# Patient Record
Sex: FEMALE | Race: WHITE | NOT HISPANIC OR LATINO | Employment: UNEMPLOYED | URBAN - METROPOLITAN AREA
[De-identification: names, ages, dates, MRNs, and addresses within clinical notes are randomized per-mention and may not be internally consistent; named-entity substitution may affect disease eponyms.]

---
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
## 2023-09-08 ENCOUNTER — APPOINTMENT (OUTPATIENT)
Dept: GENERAL RADIOLOGY | Facility: CLINIC | Age: 18
End: 2023-09-08
Attending: NURSE PRACTITIONER
Payer: COMMERCIAL

## 2023-09-08 ENCOUNTER — HOSPITAL ENCOUNTER (EMERGENCY)
Facility: CLINIC | Age: 18
Discharge: HOME | End: 2023-09-08
Attending: INTERNAL MEDICINE
Payer: COMMERCIAL

## 2023-09-08 ENCOUNTER — HOSPITAL ENCOUNTER (EMERGENCY)
Facility: CLINIC | Age: 18
Discharge: HOME | End: 2023-09-08
Attending: INTERNAL MEDICINE | Admitting: INTERNAL MEDICINE
Payer: COMMERCIAL

## 2023-09-08 VITALS
OXYGEN SATURATION: 98 % | SYSTOLIC BLOOD PRESSURE: 104 MMHG | HEART RATE: 74 BPM | RESPIRATION RATE: 16 BRPM | TEMPERATURE: 98.6 F | DIASTOLIC BLOOD PRESSURE: 72 MMHG

## 2023-09-08 VITALS
HEART RATE: 90 BPM | TEMPERATURE: 98.2 F | OXYGEN SATURATION: 98 % | DIASTOLIC BLOOD PRESSURE: 75 MMHG | RESPIRATION RATE: 18 BRPM | SYSTOLIC BLOOD PRESSURE: 110 MMHG

## 2023-09-08 DIAGNOSIS — L97.509 FOOT ULCER (H): ICD-10-CM

## 2023-09-08 DIAGNOSIS — N39.0 UTI (URINARY TRACT INFECTION): ICD-10-CM

## 2023-09-08 DIAGNOSIS — A59.9 TRICHOMONAS INFECTION: ICD-10-CM

## 2023-09-08 DIAGNOSIS — A54.9 GONORRHEA: ICD-10-CM

## 2023-09-08 DIAGNOSIS — A74.9 CHLAMYDIA: ICD-10-CM

## 2023-09-08 DIAGNOSIS — K13.79 MOUTH SORES: ICD-10-CM

## 2023-09-08 DIAGNOSIS — A59.9 TRICHIMONIASIS: ICD-10-CM

## 2023-09-08 DIAGNOSIS — R39.89 GENITAL SORE: ICD-10-CM

## 2023-09-08 DIAGNOSIS — Z53.20 TREATMENT DECLINED BY PATIENT: ICD-10-CM

## 2023-09-08 DIAGNOSIS — R05.1 ACUTE COUGH: ICD-10-CM

## 2023-09-08 DIAGNOSIS — R23.8 VESICULAR RASH: ICD-10-CM

## 2023-09-08 LAB
ALBUMIN SERPL BCG-MCNC: 3.5 G/DL (ref 3.5–5.2)
ALBUMIN UR-MCNC: 10 MG/DL
ALP SERPL-CCNC: 645 U/L (ref 35–104)
ALT SERPL W P-5'-P-CCNC: 49 U/L (ref 0–50)
ANION GAP SERPL CALCULATED.3IONS-SCNC: 11 MMOL/L (ref 7–15)
APPEARANCE UR: ABNORMAL
AST SERPL W P-5'-P-CCNC: 59 U/L (ref 0–45)
BACTERIA #/AREA URNS HPF: ABNORMAL /HPF
BASOPHILS # BLD AUTO: 0.1 10E3/UL (ref 0–0.2)
BASOPHILS NFR BLD AUTO: 1 %
BILIRUB SERPL-MCNC: 0.6 MG/DL
BILIRUB UR QL STRIP: NEGATIVE
BUN SERPL-MCNC: 7.8 MG/DL (ref 6–20)
C TRACH DNA SPEC QL NAA+PROBE: POSITIVE
CALCIUM SERPL-MCNC: 9.2 MG/DL (ref 8.6–10)
CAOX CRY #/AREA URNS HPF: ABNORMAL /HPF
CHLORIDE SERPL-SCNC: 100 MMOL/L (ref 98–107)
COLOR UR AUTO: YELLOW
CREAT SERPL-MCNC: 0.55 MG/DL (ref 0.51–0.95)
CRP SERPL-MCNC: 7.42 MG/L
DEPRECATED HCO3 PLAS-SCNC: 27 MMOL/L (ref 22–29)
EGFRCR SERPLBLD CKD-EPI 2021: >90 ML/MIN/1.73M2
EOSINOPHIL # BLD AUTO: 0.7 10E3/UL (ref 0–0.7)
EOSINOPHIL NFR BLD AUTO: 8 %
ERYTHROCYTE [DISTWIDTH] IN BLOOD BY AUTOMATED COUNT: 14.9 % (ref 10–15)
GLUCOSE SERPL-MCNC: 83 MG/DL (ref 70–99)
GLUCOSE UR STRIP-MCNC: NEGATIVE MG/DL
HBV CORE AB SERPL QL IA: NONREACTIVE
HBV SURFACE AB SERPL IA-ACNC: 0.79 M[IU]/ML
HBV SURFACE AB SERPL IA-ACNC: NONREACTIVE M[IU]/ML
HBV SURFACE AG SERPL QL IA: NONREACTIVE
HCG SERPL QL: NEGATIVE
HCG UR QL: NEGATIVE
HCT VFR BLD AUTO: 37.5 % (ref 35–47)
HCV AB SERPL QL IA: NONREACTIVE
HGB BLD-MCNC: 12.3 G/DL (ref 11.7–15.7)
HGB UR QL STRIP: NEGATIVE
HIV 1+2 AB+HIV1 P24 AG SERPL QL IA: NONREACTIVE
IMM GRANULOCYTES # BLD: 0.1 10E3/UL
IMM GRANULOCYTES NFR BLD: 1 %
INR PPP: 1.04 (ref 0.85–1.15)
KETONES UR STRIP-MCNC: NEGATIVE MG/DL
LACTATE SERPL-SCNC: 0.7 MMOL/L (ref 0.7–2)
LEUKOCYTE ESTERASE UR QL STRIP: ABNORMAL
LYMPHOCYTES # BLD AUTO: 2.7 10E3/UL (ref 0.8–5.3)
LYMPHOCYTES NFR BLD AUTO: 31 %
MCH RBC QN AUTO: 28.5 PG (ref 26.5–33)
MCHC RBC AUTO-ENTMCNC: 32.8 G/DL (ref 31.5–36.5)
MCV RBC AUTO: 87 FL (ref 78–100)
MONOCYTES # BLD AUTO: 0.5 10E3/UL (ref 0–1.3)
MONOCYTES NFR BLD AUTO: 6 %
MUCOUS THREADS #/AREA URNS LPF: PRESENT /LPF
N GONORRHOEA DNA SPEC QL NAA+PROBE: POSITIVE
NEUTROPHILS # BLD AUTO: 4.7 10E3/UL (ref 1.6–8.3)
NEUTROPHILS NFR BLD AUTO: 53 %
NITRATE UR QL: POSITIVE
NRBC # BLD AUTO: 0 10E3/UL
NRBC BLD AUTO-RTO: 0 /100
PH UR STRIP: 5.5 [PH] (ref 5–7)
PLATELET # BLD AUTO: 418 10E3/UL (ref 150–450)
POTASSIUM SERPL-SCNC: 4.3 MMOL/L (ref 3.4–5.3)
PROCALCITONIN SERPL IA-MCNC: 0.17 NG/ML
PROT SERPL-MCNC: 7.8 G/DL (ref 6.4–8.3)
RBC # BLD AUTO: 4.31 10E6/UL (ref 3.8–5.2)
RBC URINE: 9 /HPF
SARS-COV-2 RNA RESP QL NAA+PROBE: NEGATIVE
SODIUM SERPL-SCNC: 138 MMOL/L (ref 136–145)
SP GR UR STRIP: 1.02 (ref 1–1.03)
SQUAMOUS EPITHELIAL: 2 /HPF
T PALLIDUM AB SER QL: REACTIVE
T VAGINALIS DNA SPEC QL NAA+PROBE: DETECTED
TROPONIN T SERPL HS-MCNC: <6 NG/L
UROBILINOGEN UR STRIP-MCNC: NORMAL MG/DL
WBC # BLD AUTO: 8.8 10E3/UL (ref 4–11)
WBC URINE: 14 /HPF

## 2023-09-08 PROCEDURE — 250N000011 HC RX IP 250 OP 636: Mod: JZ | Performed by: NURSE PRACTITIONER

## 2023-09-08 PROCEDURE — 36415 COLL VENOUS BLD VENIPUNCTURE: CPT | Performed by: INTERNAL MEDICINE

## 2023-09-08 PROCEDURE — 86780 TREPONEMA PALLIDUM: CPT | Performed by: NURSE PRACTITIONER

## 2023-09-08 PROCEDURE — 87635 SARS-COV-2 COVID-19 AMP PRB: CPT | Performed by: NURSE PRACTITIONER

## 2023-09-08 PROCEDURE — 87086 URINE CULTURE/COLONY COUNT: CPT | Performed by: INTERNAL MEDICINE

## 2023-09-08 PROCEDURE — 99285 EMERGENCY DEPT VISIT HI MDM: CPT | Mod: 25 | Performed by: INTERNAL MEDICINE

## 2023-09-08 PROCEDURE — 86704 HEP B CORE ANTIBODY TOTAL: CPT | Performed by: NURSE PRACTITIONER

## 2023-09-08 PROCEDURE — 71046 X-RAY EXAM CHEST 2 VIEWS: CPT | Mod: 26 | Performed by: RADIOLOGY

## 2023-09-08 PROCEDURE — 96372 THER/PROPH/DIAG INJ SC/IM: CPT | Performed by: NURSE PRACTITIONER

## 2023-09-08 PROCEDURE — 87340 HEPATITIS B SURFACE AG IA: CPT | Performed by: NURSE PRACTITIONER

## 2023-09-08 PROCEDURE — 250N000009 HC RX 250: Performed by: NURSE PRACTITIONER

## 2023-09-08 PROCEDURE — 85610 PROTHROMBIN TIME: CPT | Performed by: INTERNAL MEDICINE

## 2023-09-08 PROCEDURE — 93005 ELECTROCARDIOGRAM TRACING: CPT | Performed by: INTERNAL MEDICINE

## 2023-09-08 PROCEDURE — 84145 PROCALCITONIN (PCT): CPT | Performed by: INTERNAL MEDICINE

## 2023-09-08 PROCEDURE — 99284 EMERGENCY DEPT VISIT MOD MDM: CPT | Mod: 27 | Performed by: INTERNAL MEDICINE

## 2023-09-08 PROCEDURE — 80053 COMPREHEN METABOLIC PANEL: CPT | Performed by: INTERNAL MEDICINE

## 2023-09-08 PROCEDURE — 84484 ASSAY OF TROPONIN QUANT: CPT | Performed by: NURSE PRACTITIONER

## 2023-09-08 PROCEDURE — 86593 SYPHILIS TEST NON-TREP QUANT: CPT | Performed by: NURSE PRACTITIONER

## 2023-09-08 PROCEDURE — 86706 HEP B SURFACE ANTIBODY: CPT | Performed by: NURSE PRACTITIONER

## 2023-09-08 PROCEDURE — 99284 EMERGENCY DEPT VISIT MOD MDM: CPT | Mod: 25 | Performed by: INTERNAL MEDICINE

## 2023-09-08 PROCEDURE — 87389 HIV-1 AG W/HIV-1&-2 AB AG IA: CPT | Performed by: INTERNAL MEDICINE

## 2023-09-08 PROCEDURE — 36415 COLL VENOUS BLD VENIPUNCTURE: CPT | Performed by: NURSE PRACTITIONER

## 2023-09-08 PROCEDURE — 86140 C-REACTIVE PROTEIN: CPT | Performed by: INTERNAL MEDICINE

## 2023-09-08 PROCEDURE — 86803 HEPATITIS C AB TEST: CPT | Performed by: NURSE PRACTITIONER

## 2023-09-08 PROCEDURE — 87147 CULTURE TYPE IMMUNOLOGIC: CPT | Performed by: NURSE PRACTITIONER

## 2023-09-08 PROCEDURE — 86592 SYPHILIS TEST NON-TREP QUAL: CPT | Performed by: NURSE PRACTITIONER

## 2023-09-08 PROCEDURE — 87529 HSV DNA AMP PROBE: CPT | Performed by: INTERNAL MEDICINE

## 2023-09-08 PROCEDURE — 83605 ASSAY OF LACTIC ACID: CPT | Performed by: INTERNAL MEDICINE

## 2023-09-08 PROCEDURE — 71046 X-RAY EXAM CHEST 2 VIEWS: CPT

## 2023-09-08 PROCEDURE — 87040 BLOOD CULTURE FOR BACTERIA: CPT | Performed by: NURSE PRACTITIONER

## 2023-09-08 PROCEDURE — 81025 URINE PREGNANCY TEST: CPT | Performed by: NURSE PRACTITIONER

## 2023-09-08 PROCEDURE — 87591 N.GONORRHOEAE DNA AMP PROB: CPT | Performed by: INTERNAL MEDICINE

## 2023-09-08 PROCEDURE — 81003 URINALYSIS AUTO W/O SCOPE: CPT | Performed by: INTERNAL MEDICINE

## 2023-09-08 PROCEDURE — 85025 COMPLETE CBC W/AUTO DIFF WBC: CPT | Performed by: INTERNAL MEDICINE

## 2023-09-08 PROCEDURE — 84703 CHORIONIC GONADOTROPIN ASSAY: CPT | Performed by: NURSE PRACTITIONER

## 2023-09-08 PROCEDURE — 87661 TRICHOMONAS VAGINALIS AMPLIF: CPT | Performed by: NURSE PRACTITIONER

## 2023-09-08 PROCEDURE — 87491 CHLMYD TRACH DNA AMP PROBE: CPT | Performed by: INTERNAL MEDICINE

## 2023-09-08 PROCEDURE — 93010 ELECTROCARDIOGRAM REPORT: CPT | Performed by: INTERNAL MEDICINE

## 2023-09-08 PROCEDURE — 250N000013 HC RX MED GY IP 250 OP 250 PS 637: Performed by: NURSE PRACTITIONER

## 2023-09-08 RX ORDER — METRONIDAZOLE 500 MG/1
2000 TABLET ORAL ONCE
Status: ACTIVE | OUTPATIENT
Start: 2023-09-08

## 2023-09-08 RX ORDER — DOXYCYCLINE 100 MG/1
100 CAPSULE ORAL 2 TIMES DAILY
Qty: 20 CAPSULE | Refills: 0 | Status: SHIPPED | OUTPATIENT
Start: 2023-09-08 | End: 2023-09-18

## 2023-09-08 RX ORDER — DOXYCYCLINE 100 MG/1
100 CAPSULE ORAL ONCE
Status: ACTIVE | OUTPATIENT
Start: 2023-09-08

## 2023-09-08 RX ORDER — DIPHENHYDRAMINE HYDROCHLORIDE AND LIDOCAINE HYDROCHLORIDE AND ALUMINUM HYDROXIDE AND MAGNESIUM HYDRO
5-10 KIT EVERY 6 HOURS PRN
Qty: 237 ML | Refills: 0 | Status: SHIPPED | OUTPATIENT
Start: 2023-09-08 | End: 2023-09-22

## 2023-09-08 RX ORDER — DIPHENHYDRAMINE HYDROCHLORIDE AND LIDOCAINE HYDROCHLORIDE AND ALUMINUM HYDROXIDE AND MAGNESIUM HYDRO
10 KIT EVERY 6 HOURS PRN
Status: DISCONTINUED | OUTPATIENT
Start: 2023-09-08 | End: 2023-09-08 | Stop reason: HOSPADM

## 2023-09-08 RX ORDER — ACYCLOVIR 400 MG/1
400 TABLET ORAL 3 TIMES DAILY
Qty: 21 TABLET | Refills: 0 | Status: SHIPPED | OUTPATIENT
Start: 2023-09-08 | End: 2023-09-15

## 2023-09-08 RX ORDER — METRONIDAZOLE 500 MG/1
2000 TABLET ORAL ONCE
Status: COMPLETED | OUTPATIENT
Start: 2023-09-08 | End: 2023-09-08

## 2023-09-08 RX ORDER — IBUPROFEN 600 MG/1
600 TABLET, FILM COATED ORAL ONCE
Status: COMPLETED | OUTPATIENT
Start: 2023-09-08 | End: 2023-09-08

## 2023-09-08 RX ORDER — DOXYCYCLINE 100 MG/1
100 CAPSULE ORAL ONCE
Status: COMPLETED | OUTPATIENT
Start: 2023-09-08 | End: 2023-09-08

## 2023-09-08 RX ADMIN — METRONIDAZOLE 2000 MG: 500 TABLET ORAL at 17:51

## 2023-09-08 RX ADMIN — DOXYCYCLINE HYCLATE 100 MG: 100 CAPSULE ORAL at 17:51

## 2023-09-08 ASSESSMENT — ENCOUNTER SYMPTOMS
FEVER: 0
CHILLS: 0
NUMBNESS: 0
CONFUSION: 0
ADENOPATHY: 0
WEAKNESS: 0
SEIZURES: 0
SORE THROAT: 1
BACK PAIN: 0
ABDOMINAL PAIN: 0
HEADACHES: 0
COUGH: 1

## 2023-09-08 ASSESSMENT — ACTIVITIES OF DAILY LIVING (ADL)
ADLS_ACUITY_SCORE: 35
ADLS_ACUITY_SCORE: 35

## 2023-09-08 NOTE — ED TRIAGE NOTES
Pt was seen here earlier- left AMA  Checking back in for abnormal labs, pt is unsure if she needed treatment for any of her wounds or tests from earlier today.  Pt is slumped over sleeping repeatedly in triage      Triage Assessment       Row Name 09/08/23 1864       Triage Assessment (Adult)    Airway WDL WDL       Respiratory WDL    Respiratory WDL WDL       Skin Circulation/Temperature WDL    Skin Circulation/Temperature WDL WDL       Cardiac WDL    Cardiac WDL WDL       Peripheral/Neurovascular WDL    Peripheral Neurovascular WDL WDL       Cognitive/Neuro/Behavioral WDL    Cognitive/Neuro/Behavioral WDL WDL

## 2023-09-08 NOTE — ED TRIAGE NOTES
"Open sores on tongue, toes, private areas, draining \"yellow\", and pt is coughing up mucus. Believes that is related to sores on her tongue  Sores have been present for over 1 month        "

## 2023-09-08 NOTE — ED NOTES
Pt provided with AVS.  Pt vitally stable and ambulatory on discharge.    Pt given prescriptions? Yes, pt brought to discharge pharmacy with paper scripts.    Pt instructed to follow up? Yes, even if feeling better    Questions answered.
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
## 2023-09-08 NOTE — ED PROVIDER NOTES
ED Provider Note  [facility]      History     Chief Complaint   Patient presents with    Mouth Lesions    Wound Check     HPI  Dafne Rehamn is a 18 year old female with a PMH significant for personality disorder, truancy, marijuana use, opioid abuse, suicidal ideation and homicidal ideation who presents to the emergency department for evaluation of mild, genital and foot lesions.  Patient reports she first noticed lesions around 1 month ago, they have been progressively getting worse. She also developed a cough around 1 month ago that has also been getting worse. She presents today because the pain has gotten very severe over past week and she now is having difficulty walking due to pain in feet and genital areas.  She is uncertain if she may have had exposure to any STDs, she denies any recent IV drug use although per epic review she has been seen in the emergency department previously for opioid withdrawal.  She denies any dysuria, vaginal bleeding or other vaginal discharge although does note she notices discharge from the lesions in her genital area as well as her toes.  She reports a funny sensation in her chest along with the cough.  She denies any fevers, chills, body aches, nausea, vomiting or diarrhea.    Past Medical History  Past Medical History:   Diagnosis Date    ADHD (attention deficit hyperactivity disorder)     Anxiety     Bipolar disorder (H)     Depression      No past surgical history on file.  acyclovir (ZOVIRAX) 400 MG tablet  doxycycline hyclate (VIBRAMYCIN) 100 MG capsule  magic mouthwash suspension, diphenhydrAMINE, lidocaine, aluminum-magnesium & simethicone, ([employer]MOUTHW[employer]) compounding kit      No Known Allergies  Family History  No family history on file.  Social History   Social History     Tobacco Use    Smoking status: Never    Smokeless tobacco: Never   Substance Use Topics    Alcohol use: Yes         A medically appropriate review of systems was  performed with pertinent positives and negatives noted in the HPI, and all other systems negative.    Physical Exam   BP: 104/72  Pulse: 74  Temp: 98.6  F (37  C)  Resp: 16  SpO2: 98 %  Physical Exam  Vitals and nursing note reviewed.   Constitutional:       Appearance: She is ill-appearing.      Comments: Moderate distress secondary to discomfort   HENT:      Head: Normocephalic and atraumatic.      Jaw: There is normal jaw occlusion.      Right Ear: Hearing, tympanic membrane, ear canal and external ear normal. No drainage, swelling or tenderness. No middle ear effusion. Tympanic membrane is not injected or bulging.      Left Ear: Hearing, tympanic membrane, ear canal and external ear normal. No drainage, swelling or tenderness.  No middle ear effusion. Tympanic membrane is not injected or bulging.      Mouth/Throat:      Lips: Pink. No lesions.      Mouth: Mucous membranes are moist. Oral lesions present.      Tonsils: 2+ on the right. 2+ on the left.        Comments: Tongue with white yellow coating. Multiple open lesions on roof of mouth and oropharynx   Cardiovascular:      Rate and Rhythm: Normal rate and regular rhythm.      Heart sounds: Normal heart sounds.   Pulmonary:      Effort: Pulmonary effort is normal.      Breath sounds: Normal breath sounds.   Genitourinary:     Comments: Multiple condylomas on external genitalia with whitish lesions on labia, some open sores with yellowish drainage  Musculoskeletal:         General: Normal range of motion.        Feet:    Feet:      Comments: Large open whitish lesions with yellow drainage between toes, no dry or cracking skin  Lymphadenopathy:      Cervical: Cervical adenopathy present.   Skin:     Capillary Refill: Capillary refill takes less than 2 seconds.      Comments: Skin lesions in mouth, on genitals, and in between toes bilaterally   Neurological:      General: No focal deficit present.      Mental Status: She is alert and oriented to person, place,  and time.   Psychiatric:         Mood and Affect: Mood normal.         Behavior: Behavior normal.           ED Course, Procedures, & Data     ED Course as of 09/08/23 2047   Fri Sep 08, 2023   1429 CRP Inflammation(!): 7.42   1429 UA with Microscopic reflex to Culture(!)     Procedures            EKG Interpretation:      Interpreted by MARIA LUISA Araujo CNP  Time reviewed: 1253  Symptoms at time of EKG: cough, chest discomfort   Rhythm: normal sinus with sinus arrhythmia  Rate: normal  Axis: normal  Ectopy: none  Conduction: normal  ST Segments/ T Waves: No ST-T wave changes  Q Waves: none  Comparison to prior: No old EKG available    Clinical Impression: normal EKG                 Results for orders placed or performed during the hospital encounter of 09/08/23   XR Chest 2 Views     Status: None    Narrative    EXAM: XR CHEST 2 VIEWS  9/8/2023 1:01 PM     HISTORY:  cough       COMPARISON:  Chest radiograph 10/17/2020    FINDINGS:     Upright PA and lateral chest radiograph. Trachea is midline.  Cardiomediastinal silhouette and pulmonary vasculature are within  normal limits. Right middle lobe consolidation and linear opacities.  No pleural effusion or pneumothorax..    No acute osseous abnormality. Visualized upper abdomen is  unremarkable.        Impression    IMPRESSION: Right middle lobe opacification suggestive of pneumonia  versus atelectasis.     I have personally reviewed the examination and initial interpretation  and I agree with the findings.    TRIP FRENCH MD         SYSTEM ID:  [id number]   INR     Status: Normal   Result Value Ref Range    INR 1.04 0.85 - 1.15   Comprehensive metabolic panel     Status: Abnormal   Result Value Ref Range    Sodium 138 136 - 145 mmol/L    Potassium 4.3 3.4 - 5.3 mmol/L    Chloride 100 98 - 107 mmol/L    Carbon Dioxide (CO2) 27 22 - 29 mmol/L    Anion Gap 11 7 - 15 mmol/L    Urea Nitrogen 7.8 6.0 - 20.0 mg/dL    Creatinine 0.55 0.51 - 0.95 mg/dL    Calcium 9.2 8.6 -  10.0 mg/dL    Glucose 83 70 - 99 mg/dL    Alkaline Phosphatase 645 (H) 35 - 104 U/L    AST 59 (H) 0 - 45 U/L    ALT 49 0 - 50 U/L    Protein Total 7.8 6.4 - 8.3 g/dL    Albumin 3.5 3.5 - 5.2 g/dL    Bilirubin Total 0.6 <=1.2 mg/dL    GFR Estimate >90 >60 mL/min/1.73m2   Procalcitonin     Status: Abnormal   Result Value Ref Range    Procalcitonin 0.17 (H) <0.05 ng/mL   Lactic acid whole blood     Status: Normal   Result Value Ref Range    Lactic Acid 0.7 0.7 - 2.0 mmol/L   CRP inflammation     Status: Abnormal   Result Value Ref Range    CRP Inflammation 7.42 (H) <5.00 mg/L   UA with Microscopic reflex to Culture     Status: Abnormal    Specimen: Urine, NOS   Result Value Ref Range    Color Urine Yellow Colorless, Straw, Light Yellow, Yellow    Appearance Urine Slightly Cloudy (A) Clear    Glucose Urine Negative Negative mg/dL    Bilirubin Urine Negative Negative    Ketones Urine Negative Negative mg/dL    Specific Gravity Urine 1.025 1.003 - 1.035    Blood Urine Negative Negative    pH Urine 5.5 5.0 - 7.0    Protein Albumin Urine 10 (A) Negative mg/dL    Urobilinogen Urine Normal Normal, 2.0 mg/dL    Nitrite Urine Positive (A) Negative    Leukocyte Esterase Urine Large (A) Negative    Bacteria Urine Few (A) None Seen /HPF    Mucus Urine Present (A) None Seen /LPF    Calcium Oxalate Crystals Urine Many (A) None Seen /HPF    RBC Urine 9 (H) <=2 /HPF    WBC Urine 14 (H) <=5 /HPF    Squamous Epithelials Urine 2 (H) <=1 /HPF    Narrative    Urine Culture ordered based on laboratory criteria   Troponin T, High Sensitivity     Status: Normal   Result Value Ref Range    Troponin T, High Sensitivity <6 <=14 ng/L   HCG qualitative Blood     Status: Normal   Result Value Ref Range    hCG Serum Qualitative Negative Negative   Asymptomatic COVID-19 Virus (Coronavirus) by PCR Nasopharyngeal     Status: Normal    Specimen: Nasopharyngeal; Swab   Result Value Ref Range    SARS CoV2 PCR Negative Negative    Narrative    Testing was  performed using the Xpert Xpress SARS-CoV-2 Assay on the [employer]epheid Gene-Xpert Instrument Systems. Additional information about this Emergency Use Authorization (EUA) assay can be found via the Lab Guide. This test should be ordered for the detection of SARS-CoV-2 in individuals who meet SARS-CoV-2 clinical and/or epidemiological criteria as well as from individuals without symptoms or other reasons to suspect COVID-19. Test performance for asymptomatic patients has only been established in anterior nasal swab specimens. This test is for in vitro diagnostic use under the FDA EUA for laboratories certified under CLIA to perform high complexity testing. This test has not been FDA cleared or approved. A negative result does not rule out the presence of PCR inhibitors in the specimen or target RNA concentration below the limit of detection for the assay. The possibility of a false negative should be considered if the patient's recent exposure or clinical presentation suggests COVID-19. This test was validated by [facility]. These Laboratories are certified under the Clinical Laboratory Improvement Amendments (CLIA) as qualified to perform high complexity testing.     HCG qualitative urine     Status: Normal   Result Value Ref Range    hCG Urine Qualitative Negative Negative   CBC with platelets and differential     Status: None   Result Value Ref Range    WBC Count 8.8 4.0 - 11.0 10e3/uL    RBC Count 4.31 3.80 - 5.20 10e6/uL    Hemoglobin 12.3 11.7 - 15.7 g/dL    Hematocrit 37.5 35.0 - 47.0 %    MCV 87 78 - 100 fL    MCH 28.5 26.5 - 33.0 pg    MCHC 32.8 31.5 - 36.5 g/dL    RDW 14.9 10.0 - 15.0 %    Platelet Count 418 150 - 450 10e3/uL    % Neutrophils 53 %    % Lymphocytes 31 %    % Monocytes 6 %    % Eosinophils 8 %    % Basophils 1 %    % Immature Granulocytes 1 %    NRBCs per 100 WBC 0 <1 /100    Absolute Neutrophils 4.7 1.6 - 8.3 10e3/uL    Absolute Lymphocytes 2.7 0.8 - 5.3 10e3/uL    Absolute  Monocytes 0.5 0.0 - 1.3 10e3/uL    Absolute Eosinophils 0.7 0.0 - 0.7 10e3/uL    Absolute Basophils 0.1 0.0 - 0.2 10e3/uL    Absolute Immature Granulocytes 0.1 <=0.4 10e3/uL    Absolute NRBCs 0.0 10e3/uL   EKG 12-lead, tracing only     Status: None (Preliminary result)   Result Value Ref Range    Systolic Blood Pressure  mmHg    Diastolic Blood Pressure  mmHg    Ventricular Rate 65 BPM    Atrial Rate 65 BPM    NH Interval 128 ms    QRS Duration 74 ms     ms    QTc 420 ms    P Axis -7 degrees    R AXIS 56 degrees    T Axis 37 degrees    Interpretation ECG       Sinus rhythm with marked sinus arrhythmia  Otherwise normal ECG     Chlamydia trachomatis PCR     Status: Abnormal    Specimen: Urine, Voided   Result Value Ref Range    Chlamydia trachomatis Positive (A) Negative   Neisseria gonorrhoea PCR     Status: Abnormal    Specimen: Urine, Voided   Result Value Ref Range    Neisseria gonorrhoeae Positive (A) Negative   Trichomonas vaginalis DNA PCR     Status: Abnormal    Specimen: Urine, Voided   Result Value Ref Range    Trichomonas vaginalis by PCR Detected (A) Not Detected    Narrative    The [employer] Xpert TV Assay, performed on the [employer], is a qualitative in vitro diagnostic test for the detection of Trichomonas vaginalis genomic DNA. The test utilizes automated real-time polymerase chain reaction (PCR). The Xpert TV Assay uses female and male urine specimens, endocervical swab specimens, or patient-collected vaginal swab specimens (collected in a clinical setting). The Xpert TV Assay is intended to aid in the diagnosis of trichomoniasis in symptomatic or asymptomatic individuals.   CBC with platelets differential     Status: None    Narrative    The following orders were created for panel order CBC with platelets differential.  Procedure                               Abnormality         Status                     ---------                               -----------         ------                      CBC with platelets and d...[[id number]]                      Final result                 Please view results for these tests on the individual orders.     Medications   metroNIDAZOLE (FLAGYL) tablet 2,000 mg (has no administration in time range)   cefTRIAXone (ROCEPHIN) 500 mg in lidocaine injection (has no administration in time range)   doxycycline hyclate (VIBRAMYCIN) capsule 100 mg (has no administration in time range)   ibuprofen (ADVIL/MOTRIN) tablet 600 mg (600 mg Oral Not Given 9/8/23 1343)     Labs Ordered and Resulted from Time of ED Arrival to Time of ED Departure   ROUTINE UA WITH MICROSCOPIC REFLEX TO CULTURE - Abnormal       Result Value    Color Urine Yellow      Appearance Urine Slightly Cloudy (*)     Glucose Urine Negative      Bilirubin Urine Negative      Ketones Urine Negative      Specific Gravity Urine 1.025      Blood Urine Negative      pH Urine 5.5      Protein Albumin Urine 10 (*)     Urobilinogen Urine Normal      Nitrite Urine Positive (*)     Leukocyte Esterase Urine Large (*)     Bacteria Urine Few (*)     Mucus Urine Present (*)     Calcium Oxalate Crystals Urine Many (*)     RBC Urine 9 (*)     WBC Urine 14 (*)     Squamous Epithelials Urine 2 (*)    INR - Normal    INR 1.04     LACTIC ACID WHOLE BLOOD - Normal    Lactic Acid 0.7     HCG QUALITATIVE PREGNANCY - Normal    hCG Serum Qualitative Negative     HCG QUALITATIVE URINE - Normal    hCG Urine Qualitative Negative     CBC WITH PLATELETS AND DIFFERENTIAL    WBC Count 8.8      RBC Count 4.31      Hemoglobin 12.3      Hematocrit 37.5      MCV 87      MCH 28.5      MCHC 32.8      RDW 14.9      Platelet Count 418      % Neutrophils 53      % Lymphocytes 31      % Monocytes 6      % Eosinophils 8      % Basophils 1      % Immature Granulocytes 1      NRBCs per 100 WBC 0      Absolute Neutrophils 4.7      Absolute Lymphocytes 2.7      Absolute Monocytes 0.5      Absolute Eosinophils 0.7      Absolute Basophils 0.1       Absolute Immature Granulocytes 0.1      Absolute NRBCs 0.0     HIV ANTIGEN ANTIBODY COMBO   TREPONEMA ABS W REFLEX TO RPR AND TITER   HEPATITIS B SURFACE ANTIGEN   HEPATITIS B CORE ANTIBODY   HEPATITIS B SURFACE ANTIBODY   HEPATITIS C ANTIBODY   HERPES SIMPLEX VIRUS 1&2 PCR   BLOOD CULTURE   BLOOD CULTURE   AEROBIC BACTERIAL CULTURE ROUTINE   URINE CULTURE     XR Chest 2 Views   Final Result   IMPRESSION: Right middle lobe opacification suggestive of pneumonia   versus atelectasis.       I have personally reviewed the examination and initial interpretation   and I agree with the findings.      TRIP FRENCH MD            SYSTEM ID:  [id number]             Critical care was not performed.     Medical Decision Making  The patient's presentation was of moderate complexity (an undiagnosed new problem with uncertain diagnosis).    The patient's evaluation involved:  ordering and/or review of 3+ test(s) in this encounter (see separate area of note for details)    The patient's management necessitated moderate risk (prescription drug management including medications given in the ED).    Assessment & Plan    Dafne Rehman is a 18 year old female with a PMH significant for personality disorder, truancy, marijuana use, opioid abuse, suicidal ideation and homicidal ideation who presents to the emergency department for evaluation of mild, genital and foot lesions. She is unwell appearing and in moderate distress with difficulty walking due to discomfort from lesions in genital region and feet.     Differential diagnosis includes, but not limited to, HIV, syphilis, herpes, hand foot and mouth amongst others.     Will obtain comprehensive labs, STD panel, EKG, CXR. Will start with ibuprofen and magic mouthwash for pain control.      RESULTS:  Labs:   CXR:  EKG: EKG showed normal sinus rhythm with marked arrhythmia, no ectopy, no QT/QTc prolongation.     RE-EVALUATION:       DISCUSSIONS:  - w/ Patient         DISPOSITION/PLANNING:  IMPRESSION:   -     DISPOSITION:  - Discharge to home     Young woman with history of substance abuse and mental health issues presents with cough and rash involving her soft palate, genitalia and toe webs. She has hoarse voice. Initial differential includes HSV, Behcet's, acute HIV, and hand foot and mouth disease. She eloped from the ED while awaiting labs today, but returned to the ED a couple of hours later. Initial labs notable for positive trichomonas, normal CBC without neutropenia or lymphopenia, mildly elevated procalcitonin and elevated alkaline phosphatase and AST. . Serum pregnancy is negative. Covid is negative. GC, chlamydia, HIV, RPR, HSV hepatitis B and C serology is pending as are blood culture  and wound culture CXR has atelectasis vs infiltrate in the RML. She does not appear particularly reliable. We will cover her today with ceftriaxone and doxycycline for STDs, Flagyl for trichomonas and doxycycline to cover chlamydia and community acquired atypical pneumonia. She will need follow up in clinic and review of pending lab results.     Thomas Bryson MD    Addendum: The patient eloped from her original ED visit. She did return a couple of hours later. By this time Trichomonas, GC and chlamydia had come back positive (see later note.     Unfortunately, despite counseling and encouragement from multiple providers, she refused adequate treatment for GC. She did take a dose of doxycycline and of Flagyl. She then refused further treatment and demanded discharge.     She appears to have   1) RML infiltrate  2) Gonorrhea (untreated)  3 Trichomonas (treated)  4) Chlamydia (partially treated)  5) HIV, RPR, hepatitis serology pending.     She is homeless and has no phone.     Patient independently interviewed and examined by be. I am in agreement with the findings in this note.     I have reviewed the nursing notes. I have reviewed the findings, diagnosis, plan and need for follow  up with the patient.    There are no discharge medications for this patient.      Final diagnoses:   Vesicular rash   Acute cough   Gonorrhea - Not treated   Chlamydia - Incompletely treated   Treatment declined by patient   Trichomonas infection - treated       MARIA LUISA Araujo [facility] EMERGENCY DEPARTMENT  9/8/2023     Thomas Bryson MD  09/08/23 2047

## 2023-09-09 ENCOUNTER — TELEPHONE (OUTPATIENT)
Dept: EMERGENCY MEDICINE | Facility: CLINIC | Age: 18
End: 2023-09-09
Payer: COMMERCIAL

## 2023-09-09 LAB
BACTERIA UR CULT: ABNORMAL
HSV1 DNA SPEC QL NAA+PROBE: NOT DETECTED
HSV2 DNA SPEC QL NAA+PROBE: NOT DETECTED

## 2023-09-09 NOTE — RESULT ENCOUNTER NOTE
Final Trichomonas vaginalis by PCR on 9/8/23 is POSITIVE   [facility] ED discharge antibiotic: Metronidazole (Flagyl) 2000 MG tablet, 1 tablet (2000 mg) by mouth x 1 dose   Recommendations in treatment per [facility] ED Lab result - Trichomoniasis protocol.     Known by ED Provider and treated in the ED

## 2023-09-09 NOTE — ED PROVIDER NOTES
ED Provider Note  [facility]      History     Chief Complaint   Patient presents with    Abnormal Labs     HPI  Dafne Rehman is a 18 year old female who presented to the ED earlier this afternoon with cough, and lesions of the throat, external genitalia and toes. She is homeless. She has a history of homelessness, mental health issues and substance abuse. She eloped from the ED during a visit a few hours ago. She now returns to complete the workup. Please see the note for the prior visit today for details.     Past Medical History:   Diagnosis Date    ADHD (attention deficit hyperactivity disorder)     Anxiety     Bipolar disorder (H)     Depression        History reviewed. No pertinent surgical history.    History reviewed. No pertinent family history.    Social History     Tobacco Use    Smoking status: Never    Smokeless tobacco: Never   Substance Use Topics    Alcohol use: Yes         Review of Systems   Constitutional:  Negative for chills and fever.   HENT:  Positive for sore throat. Negative for congestion.    Respiratory:  Positive for cough.    Gastrointestinal:  Negative for abdominal pain.   Genitourinary:  Positive for genital sores.   Musculoskeletal:  Negative for back pain.   Neurological:  Negative for seizures, weakness, numbness and headaches.   Hematological:  Negative for adenopathy.   Psychiatric/Behavioral:  Negative for confusion.        Physical Exam   BP: 111/77  Pulse: 93  Temp: 98.3  F (36.8  C)  Resp: 16  SpO2: 97 %  Physical Exam  Vitals and nursing note reviewed.   Constitutional:       General: She is not in acute distress.  HENT:      Head: Normocephalic.      Right Ear: External ear normal.      Left Ear: External ear normal.      Nose: Nose normal.      Mouth/Throat:      Comments: Vesicular lesions of soft palate  Eyes:      General: No scleral icterus.     Extraocular Movements: Extraocular movements intact.      Pupils: Pupils are equal, round, and  reactive to light.   Cardiovascular:      Rate and Rhythm: Normal rate.      Heart sounds: No murmur heard.  Pulmonary:      Breath sounds: No wheezing or rales.   Abdominal:      Tenderness: There is no abdominal tenderness.   Genitourinary:     Comments: See details in prior visit  Musculoskeletal:      Right lower leg: No edema.      Left lower leg: No edema.   Skin:     General: Skin is warm and dry.      Findings: Rash present.   Neurological:      General: No focal deficit present.      Mental Status: She is alert.   Psychiatric:         Behavior: Behavior normal.           ED Course, Procedures, & Data      Procedures                     Results for orders placed or performed during the hospital encounter of 09/08/23   XR Chest 2 Views     Status: None    Narrative    EXAM: XR CHEST 2 VIEWS  9/8/2023 1:01 PM     HISTORY:  cough       COMPARISON:  Chest radiograph 10/17/2020    FINDINGS:     Upright PA and lateral chest radiograph. Trachea is midline.  Cardiomediastinal silhouette and pulmonary vasculature are within  normal limits. Right middle lobe consolidation and linear opacities.  No pleural effusion or pneumothorax..    No acute osseous abnormality. Visualized upper abdomen is  unremarkable.        Impression    IMPRESSION: Right middle lobe opacification suggestive of pneumonia  versus atelectasis.     I have personally reviewed the examination and initial interpretation  and I agree with the findings.    TRIP FRENCH MD         SYSTEM ID:  [id number]   INR     Status: Normal   Result Value Ref Range    INR 1.04 0.85 - 1.15   Comprehensive metabolic panel     Status: Abnormal   Result Value Ref Range    Sodium 138 136 - 145 mmol/L    Potassium 4.3 3.4 - 5.3 mmol/L    Chloride 100 98 - 107 mmol/L    Carbon Dioxide (CO2) 27 22 - 29 mmol/L    Anion Gap 11 7 - 15 mmol/L    Urea Nitrogen 7.8 6.0 - 20.0 mg/dL    Creatinine 0.55 0.51 - 0.95 mg/dL    Calcium 9.2 8.6 - 10.0 mg/dL    Glucose 83 70 - 99 mg/dL     Alkaline Phosphatase 645 (H) 35 - 104 U/L    AST 59 (H) 0 - 45 U/L    ALT 49 0 - 50 U/L    Protein Total 7.8 6.4 - 8.3 g/dL    Albumin 3.5 3.5 - 5.2 g/dL    Bilirubin Total 0.6 <=1.2 mg/dL    GFR Estimate >90 >60 mL/min/1.73m2   Procalcitonin     Status: Abnormal   Result Value Ref Range    Procalcitonin 0.17 (H) <0.05 ng/mL   Lactic acid whole blood     Status: Normal   Result Value Ref Range    Lactic Acid 0.7 0.7 - 2.0 mmol/L   CRP inflammation     Status: Abnormal   Result Value Ref Range    CRP Inflammation 7.42 (H) <5.00 mg/L   UA with Microscopic reflex to Culture     Status: Abnormal    Specimen: Urine, NOS   Result Value Ref Range    Color Urine Yellow Colorless, Straw, Light Yellow, Yellow    Appearance Urine Slightly Cloudy (A) Clear    Glucose Urine Negative Negative mg/dL    Bilirubin Urine Negative Negative    Ketones Urine Negative Negative mg/dL    Specific Gravity Urine 1.025 1.003 - 1.035    Blood Urine Negative Negative    pH Urine 5.5 5.0 - 7.0    Protein Albumin Urine 10 (A) Negative mg/dL    Urobilinogen Urine Normal Normal, 2.0 mg/dL    Nitrite Urine Positive (A) Negative    Leukocyte Esterase Urine Large (A) Negative    Bacteria Urine Few (A) None Seen /HPF    Mucus Urine Present (A) None Seen /LPF    Calcium Oxalate Crystals Urine Many (A) None Seen /HPF    RBC Urine 9 (H) <=2 /HPF    WBC Urine 14 (H) <=5 /HPF    Squamous Epithelials Urine 2 (H) <=1 /HPF    Narrative    Urine Culture ordered based on laboratory criteria   Troponin T, High Sensitivity     Status: Normal   Result Value Ref Range    Troponin T, High Sensitivity <6 <=14 ng/L   HCG qualitative Blood     Status: Normal   Result Value Ref Range    hCG Serum Qualitative Negative Negative   Asymptomatic COVID-19 Virus (Coronavirus) by PCR Nasopharyngeal     Status: Normal    Specimen: Nasopharyngeal; Swab   Result Value Ref Range    SARS CoV2 PCR Negative Negative    Narrative    Testing was performed using the Xpert Xpress  SARS-CoV-2 Assay on the Cepheid Gene-Xpert Instrument Systems. Additional information about this Emergency Use Authorization (EUA) assay can be found via the Lab Guide. This test should be ordered for the detection of SARS-CoV-2 in individuals who meet SARS-CoV-2 clinical and/or epidemiological criteria as well as from individuals without symptoms or other reasons to suspect COVID-19. Test performance for asymptomatic patients has only been established in anterior nasal swab specimens. This test is for in vitro diagnostic use under the FDA EUA for laboratories certified under CLIA to perform high complexity testing. This test has not been FDA cleared or approved. A negative result does not rule out the presence of PCR inhibitors in the specimen or target RNA concentration below the limit of detection for the assay. The possibility of a false negative should be considered if the patient's recent exposure or clinical presentation suggests COVID-19. This test was validated by [facility]. These Laboratories are certified under the Clinical Laboratory Improvement Amendments (CLIA) as qualified to perform high complexity testing.     HCG qualitative urine     Status: Normal   Result Value Ref Range    hCG Urine Qualitative Negative Negative   CBC with platelets and differential     Status: None   Result Value Ref Range    WBC Count 8.8 4.0 - 11.0 10e3/uL    RBC Count 4.31 3.80 - 5.20 10e6/uL    Hemoglobin 12.3 11.7 - 15.7 g/dL    Hematocrit 37.5 35.0 - 47.0 %    MCV 87 78 - 100 fL    MCH 28.5 26.5 - 33.0 pg    MCHC 32.8 31.5 - 36.5 g/dL    RDW 14.9 10.0 - 15.0 %    Platelet Count 418 150 - 450 10e3/uL    % Neutrophils 53 %    % Lymphocytes 31 %    % Monocytes 6 %    % Eosinophils 8 %    % Basophils 1 %    % Immature Granulocytes 1 %    NRBCs per 100 WBC 0 <1 /100    Absolute Neutrophils 4.7 1.6 - 8.3 10e3/uL    Absolute Lymphocytes 2.7 0.8 - 5.3 10e3/uL    Absolute Monocytes 0.5 0.0 - 1.3 10e3/uL     Absolute Eosinophils 0.7 0.0 - 0.7 10e3/uL    Absolute Basophils 0.1 0.0 - 0.2 10e3/uL    Absolute Immature Granulocytes 0.1 <=0.4 10e3/uL    Absolute NRBCs 0.0 10e3/uL   EKG 12-lead, tracing only     Status: None (Preliminary result)   Result Value Ref Range    Systolic Blood Pressure  mmHg    Diastolic Blood Pressure  mmHg    Ventricular Rate 65 BPM    Atrial Rate 65 BPM    LA Interval 128 ms    QRS Duration 74 ms     ms    QTc 420 ms    P Axis -7 degrees    R AXIS 56 degrees    T Axis 37 degrees    Interpretation ECG       Sinus rhythm with marked sinus arrhythmia  Otherwise normal ECG     Chlamydia trachomatis PCR     Status: Abnormal    Specimen: Urine, Voided   Result Value Ref Range    Chlamydia trachomatis Positive (A) Negative   Neisseria gonorrhoea PCR     Status: Abnormal    Specimen: Urine, Voided   Result Value Ref Range    Neisseria gonorrhoeae Positive (A) Negative   Trichomonas vaginalis DNA PCR     Status: Abnormal    Specimen: Urine, Voided   Result Value Ref Range    Trichomonas vaginalis by PCR Detected (A) Not Detected    Narrative    The "2,10E+07" Xpert TV Assay, performed on the myEDmatch Systems, is a qualitative in vitro diagnostic test for the detection of Trichomonas vaginalis genomic DNA. The test utilizes automated real-time polymerase chain reaction (PCR). The Xpert TV Assay uses female and male urine specimens, endocervical swab specimens, or patient-collected vaginal swab specimens (collected in a clinical setting). The Xpert TV Assay is intended to aid in the diagnosis of trichomoniasis in symptomatic or asymptomatic individuals.   CBC with platelets differential     Status: None    Narrative    The following orders were created for panel order CBC with platelets differential.  Procedure                               Abnormality         Status                     ---------                               -----------         ------                     CBC with platelets  and d...[[id number]]                      Final result                 Please view results for these tests on the individual orders.     Medications   cefTRIAXone (ROCEPHIN) 500 mg in lidocaine injection (500 mg Intramuscular Not Given 9/8/23 1822)   doxycycline hyclate (VIBRAMYCIN) capsule 100 mg (100 mg Oral $Given 9/8/23 1751)   metroNIDAZOLE (FLAGYL) tablet 2,000 mg (2,000 mg Oral $Given 9/8/23 1751)     Labs Ordered and Resulted from Time of ED Arrival to Time of ED Departure - No data to display  No orders to display          Critical care was not performed.     Medical Decision Making  The patient's presentation was of moderate complexity (an undiagnosed new problem with uncertain diagnosis).    The patient's evaluation involved:  ordering and/or review of 3+ test(s) in this encounter (see separate area of note for details)    The patient's management necessitated moderate risk (prescription drug management including medications given in the ED).    Assessment & Plan    Impression:  Young woman with history of bipolar affective disorder, substance abuse and homelessness returns to the ED to complete her evaluation and treatment after eloping from the ED a couple of hours earlier. Please refer to the prior visit notes for details. Her labs are positive for trichomonas and pyuria. GC and chlamydia are positive. RPR and HIV are pending.. CXR has RML infiltrate. ALK and AST are modestly elevated. She refused ceftriaxone or other treatment for gonorrhea. She did take Flagyl for trichomonas and doxycycline. Differential of her rash includes HSV, Behcets, and acute HIV. Unfortunately she was not agreeable to adequate treatment for her gonorrhea and again left the ED without completion of recommended treatments. Other labs including HIV, RPR, Hepatitis B and C serology is pending. She was given referrals for follow up. Hopefully she will change her mind and accept appropriate treatment. [facility] may ultimately  need to become involved.    I have reviewed the nursing notes. I have reviewed the findings, diagnosis, plan and need for follow up with the patient.    Discharge Medication List as of 9/8/2023  6:21 PM        START taking these medications    Details   doxycycline hyclate (VIBRAMYCIN) 100 MG capsule Take 1 capsule (100 mg) by mouth 2 times daily for 10 days, Disp-20 capsule, R-0, Local Print      magic mouthwash suspension, diphenhydrAMINE, lidocaine, aluminum-magnesium & simethicone, (FIRST-MOUTHWASH BLM) compounding kit Swish and swallow 5-10 mLs in mouth every 6 hours as needed for mouth sores, Disp-237 mL, R-0, Local Print      acyclovir (ZOVIRAX) 400 MG tablet Take 1 tablet (400 mg) by mouth 3 times daily for 7 days, Disp-21 tablet, R-0, Local Print             Final diagnoses:   Trichimoniasis   UTI (urinary tract infection)   Genital sore   Mouth sores   Foot ulcer (H)         [facility] EMERGENCY DEPARTMENT  9/8/2023     Thomas Bryson MD  09/08/23 2011

## 2023-09-09 NOTE — RESULT ENCOUNTER NOTE
Antibiotic's administered while Patient in the [facility] Emergency Dept [if applicable]:  Ceftriaxone (Rocephin) 500 mg IM x 1 AND Doxycycline 100 mg PO x 1   [facility] ED discharge antibiotic[if applicable]: Doxycycline 100 mg PO tablet, 1 tablet (100 mg) by mouth 2 times daily for 10 days

## 2023-09-09 NOTE — LETTER
September 11, 2023        Dafne Rehman  [address] [place] [address]          Dear Dafne Rehman:    You were seen in the [facility] Emergency Department at [facility] located within [facility] EMERGENCY DEPARTMENT on 9/8/2023.  We are unable to reach you by phone, so we are sending you this letter.     It is important that you call [facility] Emergency Department lab result nurse at [phone], as we have information to relay to you AND/OR we MAY have to make some changes in your treatment.    Best time to call back is between 9AM and 5:30PM, 7 days a week.      Sincerely,     [facility] Emergency Department Lab Result RN  [phone]

## 2023-09-09 NOTE — RESULT ENCOUNTER NOTE
Final result for Herpes Simplex Virus 1 PCR and Herpes Simplex Virus 2 PCR are both NEGATIVE.    No change in treatment per [facility] ED Lab Result Herpes Simplex Virus Protocol

## 2023-09-09 NOTE — LETTER
September 9, 2023        Dafne Rehman  [address] [place] [address]          Dear Dafne Rehman:    You were seen in the [facility] Emergency Department at [facility] EMERGENCY DEPARTMENT on 9/8/2023.  We are unable to reach you by phone, so we are sending you this letter.     It is important that you call [facility] Emergency Department lab result nurse at [phone], as we have information to relay to you AND/OR we MAY have to make some changes in your treatment.    Best time to call back is between 9AM and 5:30PM, 7 days a week.      Sincerely,     [facility] Emergency Department Lab Result RN  [phone]

## 2023-09-09 NOTE — TELEPHONE ENCOUNTER
[facility] Emergency Department/Urgent Care Lab result notification  [Note:  ED Lab Results RN will reference the [facility] Emergency Dept visit note prior to contacting patient AND/OR prior to consulting Emergency Dept Provider.  Highlights of Emergency Dept visit in information summary at the bottom of this telephone note]    1. Reason for call  Notify of lab results  Assess patient symptoms [if necessary]  Review ED Providers recommendations/discharge instructions (if necessary)  Advise per [facility] ED lab result protocol    2. Lab Result (including Rx patient on, if applicable).  If culture, copy of lab report at bottom.  [facility] Emergency Dept discharge antibiotic (if prescribed): Doxycycline 100 mg PO tablet, 1 tablet (100 mg) by mouth 2 times daily for 10 days   Date of Rx (if applicable):  9/8/23  No changes in treatment per [facility] ED Lab Result Urine culture protocol.          Component      Latest Ref Rng 9/8/2023  12:43 PM   N Gonorrhea PCR      Negative  Positive !       Legend:  ! Abnormal  3. RN Assessment (Patient's current Symptoms):  Unable to reach via phone.  No phone number listed    4. RN Recommendations/Instructions per [place] ED lab result protocol  Letter sent requesting a call back.    Unable to reach patient via phone (no phone number listed).requesting a call back to [facility] ED Lab Result RN at [phone].  RN is available every day between 9 a.m. and 5:30 p.m.      Information summary from Emergency Dept/Urgent Care visit on 9/8/23  Symptoms reported at ED/UC visit (Chief complaint, HPI)     Chief Complaint   Patient presents with    Abnormal Labs      HPI  Dafne Rehman is a 18 year old female who presented to the ED earlier this afternoon with cough, and lesions of the throat, external genitalia and toes. She is homeless. She has a history of homelessness, mental health issues and substance abuse. She eloped from the ED during  a visit a few hours ago. She now returns to complete the workup. Please see the note for the prior visit today for details.       Significant Medical hx, if applicable (i.e. CKD, diabetes) See above   Allergies No Known Allergies   Weight, if applicable Wt Readings from Last 2 Encounters:   09/21/22 71.2 kg (157 lb) (86 %, Z= 1.10)*   10/17/20 90.7 kg (200 lb) (98 %, Z= 2.00)*     * Growth percentiles are based on CDC (Girls, 2-20 Years) data.      Coumadin/Warfarin [Yes /No] ?   Creatinine Level (mg/dl) Creatinine   Date Value Ref Range Status   09/08/2023 0.55 0.51 - 0.95 mg/dL Final      Creatinine clearance (ml/min), if applicable Creatinine clearance cannot be calculated (Unknown ideal weight.)   Pregnant (Yes/No/NA) HCG qual negative   Breastfeeding (Yes/No/NA) ?   ED/UC Provider Impression and Plan (applicable information) Assessment & Plan    Impression:  Young woman with history of bipolar affective disorder, substance abuse and homelessness returns to the ED to complete her evaluation and treatment after eloping from the ED a couple of hours earlier. Please refer to the prior visit notes for details. Her labs are positive for trichomonas and pyuria. GC and chlamydia are positive. RPR and HIV are pending.. CXR has RML infiltrate. ALK and AST are modestly elevated. She refused ceftriaxone or other treatment for gonorrhea. She did take Flagyl for trichomonas and doxycycline. Differential of her rash includes HSV, Behcets, and acute HIV. Unfortunately she was not agreeable to adequate treatment for her gonorrhea and again left the ED without completion of recommended treatments. Other labs including HIV, RPR, Hepatitis B and C serology is pending. She was given referrals for follow up. Hopefully she will change her mind and accept appropriate treatment. [facility] may ultimately need to become involved.     I have reviewed the nursing notes. I have reviewed the findings, diagnosis, plan and need for follow  up with the patient.   ED/UC diagnosis Trichimoniasis   UTI (urinary tract infection)   Genital sore   Mouth sores   Foot ulcer (H)      ED/UC Provider Thomas Bryson MD Erich Halberg, RN  [facility]  Emergency Dept Lab Result RN  Ph# [phone]

## 2023-09-09 NOTE — RESULT ENCOUNTER NOTE
[facility] Emergency Dept discharge antibiotic prescribed: Doxycycline 100 mg PO tablet, 1 tablet (100 mg) by mouth 2 times daily for 10 days  Incision and Drainage performed in [facility] Emergency Dept [Yes or No]: No  Recommendations in treatment per [facility] ED Lab Result culture protocol

## 2023-09-09 NOTE — RESULT ENCOUNTER NOTE
Lab called with significant value - 4+ Corynebacterium diphtheriae  Patient has no phone number listed.  Letter to be sent requesting a call back

## 2023-09-10 NOTE — TELEPHONE ENCOUNTER
Spoke with Erlinda father, Marc, and left our phone number if she should happen to stop home.  He stated he does not see her much at all.    Patrick Dennis RN  [facility]  Emergency Dept Lab Result RN  Ph# [phone]

## 2023-09-10 NOTE — RESULT ENCOUNTER NOTE
Final Urine Culture Report on 9/9/23  [facility] Emergency Dept discharge antibiotic prescribed: Doxycycline 100 mg PO tablet, 1 tablet (100 mg) by mouth 2 times daily for 10 days  #1. Bacteria, >100,000 CFU/ML Escherichia coli ,  is [NOT TESTED] to antibiotic.   Recommendations in treatment per [facility] ED lab result Urine Culture protocol.

## 2023-09-10 NOTE — RESULT ENCOUNTER NOTE
Patient on Doxycycline  No phone number.  Talked with her father Marc.  Left ED lab result phone number with him in case she contacts him.

## 2023-09-11 LAB
ATRIAL RATE - MUSE: 65 BPM
DIASTOLIC BLOOD PRESSURE - MUSE: NORMAL MMHG
INTERPRETATION ECG - MUSE: NORMAL
P AXIS - MUSE: -7 DEGREES
PR INTERVAL - MUSE: 128 MS
QRS DURATION - MUSE: 74 MS
QT - MUSE: 404 MS
QTC - MUSE: 420 MS
R AXIS - MUSE: 56 DEGREES
RPR SER QL: REACTIVE
RPR SER-TITR: ABNORMAL {TITER}
SYSTOLIC BLOOD PRESSURE - MUSE: NORMAL MMHG
T AXIS - MUSE: 37 DEGREES
VENTRICULAR RATE- MUSE: 65 BPM

## 2023-09-11 NOTE — TELEPHONE ENCOUNTER
[facility] Emergency Department/Urgent Care Lab result notification  [Note:  ED Lab Results RN will reference the [facility] Emergency Dept visit note prior to contacting patient AND/OR prior to consulting Emergency Dept Provider.  Highlights of Emergency Dept visit in information summary at the bottom of this telephone note]    1. Reason for call  Notify of lab results  Assess patient symptoms [if necessary]  Review ED Providers recommendations/discharge instructions (if necessary)  Advise per [facility] ED lab result protocol    2. Lab Result (including Rx patient on, if applicable).  If culture, copy of lab report at bottom.  See below extensive    3. 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   Retesting 6, 12, 24 months.   -spoke with father Marc again d/t he is only , left message advising if he sees patient that she is advised to return to Emergency department d/t significant lab results, he verbalized understanding and agrees with plan.    4. RN Recommendations/Instructions per [place] ED lab result protocol  [facility] ED lab result protocol used: Syphilis  Patients father was notified of need for patient to return to ED for assessment and treatment.      Copy of Lab report (if applicable)  Component      Latest Ref Rng 9/8/2023  1:06 PM   HSV Type 1 PCR      Not Detected     HSV Type 2 PCR      Not Detected     Hepatitis B Surface Antibody Instrument Value      <8.00 m[IU]/mL 0.79    Hepatitis B Surface Antibody Nonreactive    SARS CoV2 PCR      Negative     Treponema Antibodies      Nonreactive  Reactive !    Hep B Surface Agn      Nonreactive  Nonreactive     Hepatitis B Core Estrella      Nonreactive  Nonreactive    Hepatitis C Antibody      Nonreactive  Nonreactive    Rapid Plasma Reagin      Nonreactive  Reactive !    Rapid Plasma Reagin Titer      Non Reactive  >=1:256 (H)      Component      Latest Ref Rng 9/8/2023  1:15 PM   HSV Type 1 PCR      Not Detected  Not Detected    HSV Type 2 PCR      Not Detected  Not Detected    Hepatitis B Surface Antibody Instrument Value      <8.00 m[IU]/mL    Hepatitis B Surface Antibody    SARS CoV2 PCR      Negative  Negative    Treponema Antibodies      Nonreactive     Hep B Surface Agn      Nonreactive     Hepatitis B Core Estrella      Nonreactive     Hepatitis C Antibody      Nonreactive     Rapid Plasma Reagin      Nonreactive     Rapid Plasma Reagin Titer      Non Reactive        Legend:  ! Abnormal  (H) High        Component      Latest Ref Rng 9/8/2023  12:43 PM   Chlamydia Trachomatis PCR      Negative  Positive !    N Gonorrhea PCR      Negative  Positive !    Trichomonas vaginalis DNA PCR      Not Detected  Detected !    HCG Qual Urine      Negative  Negative       Legend:  ! Abnormal    Urine Culture  Order: [id number] - Reflex for Order [id number]  Status: Final result       Visible to patient: No (inaccessible in MyChart)    Specimen Information: Urine, NOS   3 Result Notes  Culture >100,000 CFU/mL Escherichia coli Abnormal            Resulting Agency: [facility]     Susceptibility    Escherichia coli (1)    Antibiotic Interpretation Sensitivity  Method Status    Ampicillin Resistant >=32 ug/mL JHONY Final    Ampicillin/ Sulbactam Resistant >=32 ug/mL JHONY Final    Piperacillin/Tazobactam Susceptible <=4 ug/mL JHONY Final    Cefazolin Susceptible <=4 ug/mL JHONY Final     Cefazolin JHONY breakpoints are for the treatment of uncomplicated urinary tract infections. For the treatment of systemic infections, please contact the laboratory for additional testing.       Cefoxitin Susceptible <=4 ug/mL JHONY Final    Ceftazidime Susceptible <=1 ug/mL  JHONY Final    Ceftriaxone Susceptible <=1 ug/mL JHONY Final    Cefepime Susceptible <=1 ug/mL JHONY Final    Gentamicin Susceptible <=1 ug/mL JHONY Final    Tobramycin Susceptible <=1 ug/mL JHONY Final    Ciprofloxacin Susceptible <=0.25 ug/mL JHONY Final    Levofloxacin Susceptible <=0.12 ug/mL JHONY Final    Nitrofurantoin Susceptible <=16 ug/mL JHONY Final    Trimethoprim/Sulfamethoxazole Susceptible <=1/19 ug/mL JHONY Final          Specimen Collected: 09/08/23 12:43 PM Last Resulted: 09/09/23 11:41 PM             Swab Aerobic Bacterial Culture Routine  Order: [id number]  Status: Preliminary result       Visible to patient: No (not released)    Specimen Information: Condyloma; Swab   3 Result Notes  Culture Culture in progress      4+ Streptococcus dysgalactiae (Group C/G Streptococcus) Abnormal    This organism is susceptible to ampicillin, penicillin, vancomycin and the cephalosporins. If treatment is required and your patient is allergic to penicillin, contact the microbiology lab within 5 days to request susceptibility testing.   1+ Escherichia coli Abnormal       1+ Staphylococcus aureus Abnormal       4+ Corynebacterium diphtheriae Abnormal    Identification is preliminary, confirmation in progress   4+ Normal brenda           Resulting Agency: [facility]     Susceptibility    Escherichia coli (2)    Antibiotic Interpretation Sensitivity  Method Status    Ampicillin Resistant >=32 ug/mL JHONY Final    Ampicillin/ Sulbactam Intermediate 16 ug/mL JHONY Final    Piperacillin/Tazobactam Susceptible <=4 ug/mL JHONY Final    Ceftazidime Susceptible <=1 ug/mL JHONY Final    Ceftriaxone Susceptible <=1 ug/mL JHONY Final    Cefepime Susceptible <=1 ug/mL JHONY Final    Meropenem Susceptible <=0.25 ug/mL JHONY Final    Gentamicin Susceptible <=1 ug/mL JHONY Final    Tobramycin Susceptible <=1 ug/mL JHONY Final    Ciprofloxacin Susceptible <=0.25 ug/mL JHONY Final    Levofloxacin Susceptible <=0.12 ug/mL JHONY Final    Trimethoprim/Sulfamethoxazole Susceptible  <=1/19 ug/mL JHONY Final    Staphylococcus aureus (3)    Antibiotic Interpretation Sensitivity  Method Status    Oxacillin Susceptible 1 ug/mL JHONY Final     Oxacillin susceptible isolates are susceptible to cephalosporins (example: cefazolin and cephalexin) and beta lactam combination agents. Oxacillin resistant isolates are resistant to these agents.       Gentamicin Susceptible <=0.5 ug/mL JHONY Final    Erythromycin Susceptible <=0.25 ug/mL JHONY Final    Clindamycin Susceptible 0.25 ug/mL JHONY Final    Vancomycin Susceptible <=0.5 ug/mL JHONY Final    Daptomycin Susceptible 0.5 ug/mL JHONY Final    Tetracycline Susceptible <=1 ug/mL JHONY Final    Doxycycline Susceptible <=0.5 ug/mL JHONY Final    Trimethoprim/Sulfamethoxazole Susceptible <=0.5/9.5 ug/mL JHONY Final    Susceptibility Comments       Corynebacterium diphtheriae (4)    Antibiotic Interpretation Sensitivity  Method Status    Penicillin Intermediate 0.75 ug/mL JHONY Final    Ceftriaxone Intermediate 2 ug/mL JHONY Final    Clindamycin Susceptible 0.125 ug/mL JHONY Final    Trimethoprim/Sulfamethoxazole Susceptible 2.000 ug/mL JHONY Final    Vancomycin Susceptible 1 ug/mL JHONY Final    Doxycycline Susceptible 0.25 ug/mL JHONY Final     Condensed View         Specimen Collected: 09/08/23 12:29 PM Last Resulted: 09/11/23  2:02 PM           Isha Yañez RN  [facility]  Emergency Dept Lab Result RN  Ph# [phone]

## 2023-09-11 NOTE — TELEPHONE ENCOUNTER
Patient calling back to check on her test results and why she needs to go to ER.    Information from previous call relayed to patient, who verbalized understanding.    Carolina Weir RN  [facility]

## 2023-09-11 NOTE — TELEPHONE ENCOUNTER
Received a disease report card.  Could not fill It out as patient has NEVER been seen  here.  She is homeless, and has  no phone.      We did  not keep this in records or copy it to HIM[facility].    Tracey Cole

## 2023-09-12 NOTE — TELEPHONE ENCOUNTER
[facility] Emergency Department/Urgent Care Lab result notification  [Note:  ED Lab Results RN will reference the [facility] Emergency Dept visit note prior to contacting patient AND/OR prior to consulting Emergency Dept Provider.  Highlights of Emergency Dept visit in information summary at the bottom of this telephone note]    1. Reason for call  Notify of lab results  Assess patient symptoms [if necessary]  Review ED Providers recommendations/discharge instructions (if necessary)  Advise per [facility] ED lab result protocol    2. Lab Result (including Rx patient on, if applicable).  If culture, copy of lab report at bottom.  Component      Latest Ref Rng 9/8/2023  1:06 PM   HSV Type 1 PCR      Not Detected     HSV Type 2 PCR      Not Detected     Hepatitis B Surface Antibody Instrument Value      <8.00 m[IU]/mL 0.79    Hepatitis B Surface Antibody Nonreactive    SARS CoV2 PCR      Negative     Treponema Antibodies      Nonreactive  Reactive !    Hep B Surface Agn      Nonreactive  Nonreactive    Hepatitis B Core Estrella      Nonreactive  Nonreactive    Hepatitis C Antibody      Nonreactive  Nonreactive    Rapid Plasma Reagin      Nonreactive  Reactive !    Rapid Plasma Reagin Titer      Non Reactive  >=1:256 (H)      Component      Latest Ref Rng 9/8/2023  1:15 PM   HSV Type 1 PCR      Not Detected  Not Detected    HSV Type 2 PCR      Not Detected  Not Detected    Hepatitis B Surface Antibody Instrument Value      <8.00 m[IU]/mL    Hepatitis B Surface Antibody    SARS CoV2 PCR      Negative  Negative    Treponema Antibodies      Nonreactive     Hep B Surface Agn      Nonreactive     Hepatitis B Core Estrella      Nonreactive     Hepatitis C Antibody      Nonreactive     Rapid Plasma Reagin      Nonreactive     Rapid Plasma Reagin Titer      Non Reactive        Legend:  ! Abnormal  (H) High    Component      Latest Ref Rng 9/8/2023  12:43 PM 9/8/2023  1:06 PM   HIV Antigen Antibody Combo       "Nonreactive   Nonreactive    Chlamydia Trachomatis PCR      Negative  Positive !     N Gonorrhea PCR      Negative  Positive !     Trichomonas vaginalis DNA PCR      Not Detected  Detected !        Legend:  ! Abnormal    3. RN Assessment (Patient's current Symptoms):  Time of call: 9/12/2023 10:46 AM  Assessment: patient says she picked up antibiotics that she did prescribe, patient feels she says she is in a safe space at this time,   Fever:  None  Flank pain:  None  Nausea/vomiting:  None  Lesions: maybe spreading/worsening of lesions  Genitourinary symptoms:  pain burning frequency, urgency \"a little bit not terrible\"      4. RN Recommendations/Instructions per [place] ED lab result protocol  [facility] ED lab result protocol used: Urine culture, chlamydia, gonorrhea, syphilis, culture, HSV, blood/body fluid exposure  Patient was notified of lab result and treatment recommendations  RN reviewed information about extensive bacterial infections present requiring treatment and need for return to emergency department immediately please- we discussed the consequence of untreated infections advancing to other organs (for example pyelonephritis), and into blood (cx's negative at this time), and advancement to sepsis/death if untreated.  We are available 24/7 and patient is welcome to return at anytime for treatment please.  Resources can be discussed if you are concerned about payment/finances - please do not let this deter you from seeking the care you need at this time.  Patient verbalized understanding and agrees with plan.    5. Please Contact your PCP clinic or return to the Emergency department if your:  Symptoms return.  Symptoms do not improve after 3 days on antibiotic.  Symptoms do not resolve after completing antibiotic.  Symptoms worsen or other concerning symptoms.      Copy of Lab report (if applicable)  Swab Aerobic Bacterial Culture Routine  Order: [id number]  Collected 9/8/2023 12:29 PM     "   Status: Preliminary result       Visible to patient: No (not released)    Specimen Information: Condyloma; Swab   4 Result Notes  Culture Culture in progress      4+ Streptococcus dysgalactiae (Group C/G Streptococcus) Abnormal    This organism is susceptible to ampicillin, penicillin, vancomycin and the cephalosporins. If treatment is required and your patient is allergic to penicillin, contact the microbiology lab within 5 days to request susceptibility testing.   1+ Escherichia coli Abnormal       1+ Staphylococcus aureus Abnormal       4+ Corynebacterium diphtheriae Abnormal    Identification is preliminary, confirmation in progress   4+ Normal brenda           Resulting Agency: [facility]     Susceptibility     Escherichia coli Staphylococcus aureus Corynebacterium diphtheriae     JHONY JHONY JHONY     Ampicillin >=32 ug/mL Resistant         Ampicillin/ Sulbactam 16 ug/mL Intermediate         Cefepime <=1 ug/mL Susceptible         Ceftazidime <=1 ug/mL Susceptible         Ceftriaxone <=1 ug/mL Susceptible   2 ug/mL Intermediate     Ciprofloxacin <=0.25 ug/mL Susceptible         Clindamycin   0.25 ug/mL Susceptible 0.125 ug/mL Susceptible     Daptomycin   0.5 ug/mL Susceptible       Doxycycline   <=0.5 ug/mL Susceptible 0.25 ug/mL Susceptible     Erythromycin   <=0.25 ug/mL Susceptible       Gentamicin <=1 ug/mL Susceptible <=0.5 ug/mL Susceptible       Levofloxacin <=0.12 ug/mL Susceptible         Meropenem <=0.25 ug/mL Susceptible         Oxacillin   1 ug/mL Susceptible 1       Penicillin     0.75 ug/mL Intermediate     Piperacillin/Tazobactam <=4 ug/mL Susceptible         Tetracycline   <=1 ug/mL Susceptible       Tobramycin <=1 ug/mL Susceptible         Trimethoprim/Sulfamethoxazole <=1/19 ug/mL Susceptible <=0.5/9.5 u... Susceptible 2.000 ug/mL Susceptible     Vancomycin   <=0.5 ug/mL Susceptible 1 ug/mL Susceptible                  1 Oxacillin susceptible isolates are susceptible to cephalosporins (example: cefazolin  and cephalexin) and beta lactam combination agents. Oxacillin resistant isolates are resistant to these agents.      Susceptibility Comments    Staphylococcus aureus            Specimen Collected: 09/08/23 12:29 PM Last Resulted: 09/11/23  2:02 PM               Urine Culture  Order: [id number]  Collected 9/8/2023 12:43 PM       Status: Final result       Visible to patient: No (inaccessible in MyChart)    Specimen Information: Urine, NOS   4 Result Notes  Culture >100,000 CFU/mL Escherichia coli Abnormal            Resulting Agency: [facility]     Susceptibility     Escherichia coli     JHONY     Ampicillin >=32 ug/mL Resistant     Ampicillin/ Sulbactam >=32 ug/mL Resistant     Cefazolin <=4 ug/mL Susceptible 1     Cefepime <=1 ug/mL Susceptible     Cefoxitin <=4 ug/mL Susceptible     Ceftazidime <=1 ug/mL Susceptible     Ceftriaxone <=1 ug/mL Susceptible     Ciprofloxacin <=0.25 ug/mL Susceptible     Gentamicin <=1 ug/mL Susceptible     Levofloxacin <=0.12 ug/mL Susceptible     Nitrofurantoin <=16 ug/mL Susceptible     Piperacillin/Tazobactam <=4 ug/mL Susceptible     Tobramycin <=1 ug/mL Susceptible     Trimethoprim/Sulfamethoxazole <=1/19 ug/mL Susceptible              1 Cefazolin JHONY breakpoints are for the treatment of uncomplicated urinary tract infections. For the treatment of systemic infections, please contact the laboratory for additional testing.            Specimen Collected: 09/08/23 12:43 PM Last Resulted: 09/09/23 11:41 PM             Isha Yañez RN  [facility]  Emergency Dept Lab Result RN  Ph# [phone]

## 2023-09-13 LAB
BACTERIA BLD CULT: NO GROWTH
BACTERIA BLD CULT: NO GROWTH

## 2023-10-06 LAB
BACTERIA SPEC CULT: ABNORMAL